# Patient Record
Sex: MALE | Race: WHITE | NOT HISPANIC OR LATINO | URBAN - METROPOLITAN AREA
[De-identification: names, ages, dates, MRNs, and addresses within clinical notes are randomized per-mention and may not be internally consistent; named-entity substitution may affect disease eponyms.]

---

## 2017-08-12 ENCOUNTER — EMERGENCY (EMERGENCY)
Facility: HOSPITAL | Age: 27
LOS: 1 days | Discharge: HOME | End: 2017-08-12
Attending: STUDENT IN AN ORGANIZED HEALTH CARE EDUCATION/TRAINING PROGRAM
Payer: COMMERCIAL

## 2017-08-12 VITALS
HEIGHT: 70 IN | DIASTOLIC BLOOD PRESSURE: 76 MMHG | OXYGEN SATURATION: 100 % | WEIGHT: 298.06 LBS | SYSTOLIC BLOOD PRESSURE: 116 MMHG | HEART RATE: 64 BPM | RESPIRATION RATE: 18 BRPM | TEMPERATURE: 98 F

## 2017-08-12 VITALS
SYSTOLIC BLOOD PRESSURE: 120 MMHG | OXYGEN SATURATION: 98 % | TEMPERATURE: 98 F | DIASTOLIC BLOOD PRESSURE: 74 MMHG | HEART RATE: 62 BPM | RESPIRATION RATE: 16 BRPM
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
## 2017-08-12 DIAGNOSIS — M54.5 LOW BACK PAIN: ICD-10-CM
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
## 2017-08-12 PROCEDURE — 72100 X-RAY EXAM L-S SPINE 2/3 VWS: CPT

## 2017-08-12 PROCEDURE — 72100 X-RAY EXAM L-S SPINE 2/3 VWS: CPT | Mod: 26

## 2017-08-12 PROCEDURE — 99285 EMERGENCY DEPT VISIT HI MDM: CPT

## 2017-08-12 PROCEDURE — 96374 THER/PROPH/DIAG INJ IV PUSH: CPT

## 2017-08-12 PROCEDURE — 96375 TX/PRO/DX INJ NEW DRUG ADDON: CPT

## 2017-08-12 PROCEDURE — 99284 EMERGENCY DEPT VISIT MOD MDM: CPT | Mod: 25

## 2017-08-12 RX ORDER — KETOROLAC TROMETHAMINE 30 MG/ML
30 SYRINGE (ML) INJECTION ONCE
Qty: 0 | Refills: 0 | Status: DISCONTINUED | OUTPATIENT
Start: 2017-08-12 | End: 2017-08-12

## 2017-08-12 RX ORDER — DIAZEPAM 5 MG
1 TABLET ORAL
Qty: 6 | Refills: 0 | OUTPATIENT
Start: 2017-08-12 | End: 2017-08-14

## 2017-08-12 RX ORDER — SODIUM CHLORIDE 9 MG/ML
1000 INJECTION INTRAMUSCULAR; INTRAVENOUS; SUBCUTANEOUS ONCE
Qty: 0 | Refills: 0 | Status: COMPLETED | OUTPATIENT
Start: 2017-08-12 | End: 2017-08-12

## 2017-08-12 RX ADMIN — Medication 30 MILLIGRAM(S): at 15:13

## 2017-08-12 RX ADMIN — Medication 30 MILLIGRAM(S): at 15:45

## 2017-08-12 RX ADMIN — SODIUM CHLORIDE 1000 MILLILITER(S): 9 INJECTION INTRAMUSCULAR; INTRAVENOUS; SUBCUTANEOUS at 15:14

## 2017-08-12 NOTE — ED PROVIDER NOTE - MUSCULOSKELETAL, MLM
Spine appears normal, L sided paravertebral lumbar tenderness, no midline tenderness, positive straight leg raise on the left, 5/5 extremity strength b/l

## 2017-08-12 NOTE — ED PROVIDER NOTE - OBJECTIVE STATEMENT
27 y/o M pt w/ no significant PMHx was BIB EMS to ED c/o L lower back pain s/p injury today. Pt states that he was doing squats at the gym when he heard a pop in his lower back. Pt also notes that he pulled his back 3 weeks ago while working out; pt went to physical therapy and was told he could go back to the gym. Pt reports that he has never had an MRI or an X-ray of his back. Pt denies numbness/tingling, focal weakness, bladder/bowel dysfunction, or any other complaints. NKDA.

## 2017-08-12 NOTE — ED PROVIDER NOTE - MEDICAL DECISION MAKING DETAILS
25 y/o M pt w/ L sided lower back pain. No bladder/bowel dysfunction, no numbness, no tingling. Will obtain X-ray r.o fracture, IV Toradol and IV Valium, reevaluate. 27 y/o M pt w/ L sided lower back pain s/p squatting this afternoon. No bladder/bowel dysfunction, no numbness, no tingling. Will obtain X-ray r.o fracture, IV Toradol and IV Valium, reevaluate.

## 2017-08-12 NOTE — ED PROVIDER NOTE - PROGRESS NOTE DETAILS
On re-eval- patient reports pain has improved to 2/10- feels better at this time; patient with family at bedside who will take patient home; understands need for close outpatient f/u with pmd, ortho in 1-2 days without fail; return to ED for worsening pain; incontinence of bowel or bladder function; numbness/tingling or any other concerns. Ambulatory in ED at discharge; notified of xray results. Instructed not to take valium when driving or at work; only when at home.

## 2017-08-12 NOTE — ED ADULT NURSE NOTE - OBJECTIVE STATEMENT
c/o lower back pain s/ p working out at gym today patient  was squatting with weights says can not walk now

## 2021-01-01 NOTE — ED PROVIDER NOTE - ATTESTATION, MLM
Bedside and Verbal shift change report given to Anaid Dimas RN (oncoming nurse) by Thuy Conner. Felipe RN (offgoing nurse). Report included the following information SBAR, Kardex and MAR. I have reviewed and confirmed nurses' notes for patient's medications, allergies, medical history, and surgical history.